# Patient Record
Sex: MALE | URBAN - NONMETROPOLITAN AREA
[De-identification: names, ages, dates, MRNs, and addresses within clinical notes are randomized per-mention and may not be internally consistent; named-entity substitution may affect disease eponyms.]

---

## 2020-01-30 ENCOUNTER — TELEPHONE (OUTPATIENT)
Dept: GASTROENTEROLOGY | Facility: CLINIC | Age: 70
End: 2020-01-30

## 2020-01-30 NOTE — TELEPHONE ENCOUNTER
PER THE PATIENT ASKED THAT HIS RECALL BE PUSHED UNTIL 10/2021. TOLD HIM THAT I WOULD RELAY THIS MESSAGE.THANKS:)

## 2020-01-30 NOTE — TELEPHONE ENCOUNTER
Called pt back and he states he would call us when he is ready to schedule his recall colonoscopy.mr